# Patient Record
Sex: FEMALE | Race: WHITE | URBAN - METROPOLITAN AREA
[De-identification: names, ages, dates, MRNs, and addresses within clinical notes are randomized per-mention and may not be internally consistent; named-entity substitution may affect disease eponyms.]

---
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
## 2019-03-01 DIAGNOSIS — Z78.9 TRANSGENDER: ICD-10-CM

## 2019-03-01 NOTE — TELEPHONE ENCOUNTER
Note from 8/21/18 OV:  Follow up in 6 months.    [employer] message sent to patient asking him to schedule appointment.  Gwendolyn Lopes, RN    Requested Prescriptions   Pending Prescriptions Disp Refills     testosterone cypionate (DEPOTESTOSTERONE) 200 MG/ML injection [Pharmacy Med Name: TESTOST CYP 200MG/ML INJ]  19     Sig: INJECT 0.25 (ONE-FOURTH) ML (CC) INTRAMUSCULARLY ONCE A WEEK    Androgen Agents Failed - 3/1/2019 10:39 AM       Failed - Lipid panel on file in past 12 mos    Recent Labs   Lab Test 11/10/17  1619   CHOL 159   TRIG 109*   HDL 53   LDL 84   NHDL 106              Failed - ALT on file within past 12 mos    Recent Labs   Lab Test 11/10/17  1619   ALT 25            Failed - HCT less than 54% on file within past 12 mos    Recent Labs   Lab Test 11/10/17  1619   HCT 38.3            Failed - Serum Testosterone on file within past 12 mos    No lab results found.         Failed - Refills for this classification require provider review       Failed - Blood pressure under 140/90 in past 6 months    BP Readings from Last 3 Encounters:   08/21/18 117/77   11/10/17 100/60   07/03/12 105/52 (40 %/ 15 %)*     *BP percentiles are based on the August 2017 AAP Clinical Practice Guideline for girls                Failed - Recent (6 mo) or future (30 days) visit within the authorizing provider's specialty       Failed - AST on file within past 12 mos    Recent Labs   Lab Test 11/10/17  1619   AST 18            Passed - Patient is of age 12 and older       Passed - Medication is active on med list       Passed - Patient is not pregnant       Passed - No positive pregnancy test on file within past 12 mos

## 2019-03-06 NOTE — TELEPHONE ENCOUNTER
Patient has not read [employer] message  Left non detailed VM for pt asking that they callback and schedule f/u appt with PCP  Nanci HOBBS RN

## 2019-03-07 RX ORDER — TESTOSTERONE CYPIONATE 200 MG/ML
INJECTION, SOLUTION INTRAMUSCULAR
Qty: 10 ML | Refills: 0 | Status: SHIPPED | OUTPATIENT
Start: 2019-03-07 | End: 2019-04-29

## 2019-04-02 ENCOUNTER — TELEPHONE (OUTPATIENT)
Dept: FAMILY MEDICINE | Facility: CLINIC | Age: 19
End: 2019-04-02

## 2019-04-02 NOTE — TELEPHONE ENCOUNTER
Prior Authorization Retail Medication Request    Medication/Dose:   ICD code (if different than what is on RX):    Previously Tried and Failed:    Rationale:      Insurance Name:  [employer] [phone]  Insurance ID:  [id number]      Pharmacy Information (if different than what is on RX)  Name:  [facility] [address] [facility]  Phone:  [phone]

## 2019-04-08 NOTE — TELEPHONE ENCOUNTER
Central Prior Authorization Team   Phone: [phone]      PA Initiation    Medication: testosterone cypionate (DEPOTESTOSTERONE) 200 MG/ML injection  Insurance Company: [employer] - Phone [phone] Fax [phone]  Pharmacy Filling the Rx: [facility] [address] [place] [address]  Filling Pharmacy Phone: [phone]  Filling Pharmacy Fax:    Start Date: 4/8/2019

## 2019-04-08 NOTE — TELEPHONE ENCOUNTER
Prior Authorization Approval    Authorization Effective Date: 3/9/2019  Authorization Expiration Date: 4/7/2020  Medication: testosterone cypionate (DEPOTESTOSTERONE) 200 MG/ML injection-APPROVED  Approved Dose/Quantity:   Reference #:     Insurance Company: [employer] [phone] Fax [phone]  Expected CoPay:       CoPay Card Available:      Foundation Assistance Needed:    Which Pharmacy is filling the prescription (Not needed for infusion/clinic administered): [facility] [address] [place] [address]  Pharmacy Notified: Yes  Patient Notified: No    Pharmacy will notify patient when medication is ready.

## 2019-04-29 ENCOUNTER — OFFICE VISIT (OUTPATIENT)
Dept: FAMILY MEDICINE | Facility: CLINIC | Age: 19
End: 2019-04-29
Payer: COMMERCIAL

## 2019-04-29 VITALS
SYSTOLIC BLOOD PRESSURE: 127 MMHG | RESPIRATION RATE: 16 BRPM | OXYGEN SATURATION: 97 % | WEIGHT: 164 LBS | HEIGHT: 66 IN | DIASTOLIC BLOOD PRESSURE: 76 MMHG | BODY MASS INDEX: 26.36 KG/M2 | HEART RATE: 94 BPM | TEMPERATURE: 97.3 F

## 2019-04-29 DIAGNOSIS — Z72.89 DELIBERATE SELF-CUTTING: ICD-10-CM

## 2019-04-29 DIAGNOSIS — F32.3 MAJOR DEPRESSIVE DISORDER, SINGLE EPISODE, SEVERE WITH PSYCHOTIC FEATURES (H): ICD-10-CM

## 2019-04-29 DIAGNOSIS — Z78.9 TRANSGENDER: Primary | ICD-10-CM

## 2019-04-29 LAB
ALBUMIN SERPL-MCNC: 4.1 G/DL (ref 3.4–5)
ALP SERPL-CCNC: 80 U/L (ref 40–150)
ALT SERPL W P-5'-P-CCNC: 40 U/L (ref 0–50)
ANION GAP SERPL CALCULATED.3IONS-SCNC: 5 MMOL/L (ref 3–14)
AST SERPL W P-5'-P-CCNC: 21 U/L (ref 0–35)
BASOPHILS # BLD AUTO: 0 10E9/L (ref 0–0.2)
BASOPHILS NFR BLD AUTO: 0.2 %
BILIRUB SERPL-MCNC: 0.2 MG/DL (ref 0.2–1.3)
BUN SERPL-MCNC: 9 MG/DL (ref 7–30)
CALCIUM SERPL-MCNC: 8.9 MG/DL (ref 8.5–10.1)
CHLORIDE SERPL-SCNC: 108 MMOL/L (ref 96–110)
CO2 SERPL-SCNC: 27 MMOL/L (ref 20–32)
CREAT SERPL-MCNC: 0.74 MG/DL (ref 0.5–1)
DIFFERENTIAL METHOD BLD: NORMAL
EOSINOPHIL # BLD AUTO: 0.5 10E9/L (ref 0–0.7)
EOSINOPHIL NFR BLD AUTO: 6 %
ERYTHROCYTE [DISTWIDTH] IN BLOOD BY AUTOMATED COUNT: 14.1 % (ref 10–15)
GFR SERPL CREATININE-BSD FRML MDRD: >90 ML/MIN/{1.73_M2}
GLUCOSE SERPL-MCNC: 118 MG/DL (ref 70–99)
HCT VFR BLD AUTO: 42.9 % (ref 35–47)
HGB BLD-MCNC: 14.1 G/DL (ref 11.7–15.7)
LYMPHOCYTES # BLD AUTO: 2.6 10E9/L (ref 0.8–5.3)
LYMPHOCYTES NFR BLD AUTO: 31.3 %
MCH RBC QN AUTO: 28.3 PG (ref 26.5–33)
MCHC RBC AUTO-ENTMCNC: 32.9 G/DL (ref 31.5–36.5)
MCV RBC AUTO: 86 FL (ref 78–100)
MONOCYTES # BLD AUTO: 0.7 10E9/L (ref 0–1.3)
MONOCYTES NFR BLD AUTO: 8.5 %
NEUTROPHILS # BLD AUTO: 4.6 10E9/L (ref 1.6–8.3)
NEUTROPHILS NFR BLD AUTO: 54 %
PLATELET # BLD AUTO: 252 10E9/L (ref 150–450)
POTASSIUM SERPL-SCNC: 4.3 MMOL/L (ref 3.4–5.3)
PROT SERPL-MCNC: 7.5 G/DL (ref 6.8–8.8)
RBC # BLD AUTO: 4.98 10E12/L (ref 3.8–5.2)
SODIUM SERPL-SCNC: 140 MMOL/L (ref 133–144)
WBC # BLD AUTO: 8.4 10E9/L (ref 4–11)

## 2019-04-29 PROCEDURE — 99214 OFFICE O/P EST MOD 30 MIN: CPT | Performed by: FAMILY MEDICINE

## 2019-04-29 PROCEDURE — 36415 COLL VENOUS BLD VENIPUNCTURE: CPT | Performed by: FAMILY MEDICINE

## 2019-04-29 PROCEDURE — 85025 COMPLETE CBC W/AUTO DIFF WBC: CPT | Performed by: FAMILY MEDICINE

## 2019-04-29 PROCEDURE — 80053 COMPREHEN METABOLIC PANEL: CPT | Performed by: FAMILY MEDICINE

## 2019-04-29 RX ORDER — TESTOSTERONE CYPIONATE 200 MG/ML
75 INJECTION, SOLUTION INTRAMUSCULAR WEEKLY
Qty: 7 ML | Refills: 1 | Status: SHIPPED | OUTPATIENT
Start: 2019-04-29

## 2019-04-29 RX ORDER — QUETIAPINE FUMARATE 300 MG/1
TABLET, FILM COATED ORAL
COMMUNITY
Start: 2019-04-29

## 2019-04-29 ASSESSMENT — PATIENT HEALTH QUESTIONNAIRE - PHQ9: SUM OF ALL RESPONSES TO PHQ QUESTIONS 1-9: 9

## 2019-04-29 ASSESSMENT — MIFFLIN-ST. JEOR: SCORE: 1535.65

## 2019-04-29 NOTE — PROGRESS NOTES
SUBJECTIVE:   Shan Irwin is a 18 year old female who presents to clinic today for the following   health issues:      Medication Followup of testosterone     Taking Medication as prescribed: yes    Side Effects:  None    Medication Helping Symptoms:  yes       Last time was [facility] for mental health reasons  No suicide attempts  Since then is doing much better  Doesn't think starting Testosterone was a factor    [facility] Follow-up to PHQ 11/10/2017 8/23/2018 4/29/2019   PHQ-9 9. Suicide Ideation past 2 weeks Not at all More than half the days Several days   Thoughts of suicide or self harm in past 2 weeks - - No   PHQ-9 Safety concerns? - - No     Patient says that he has been overall stable with mood  Does still think about suicide sometimes but it is passive and does not have a plan    1-How do you identify? BOLD all that apply   Male   2-What age did you first feel your gender identity (internal sense of gender) did not match your physical body?      7th grade  3-Have you ever felt depressed or suicidal because your gender identity and body don't match?      YES    4-Have you legally changed your name? Yes   If yes: prior name for HETAL:   Gender marker on ID's?   Yes  5-Have you ever seen a health care provider about being transgender? YES therapy, but not specifically for gener issues  6-What hormones have you been on and for how long? (These can be treatments you were prescribed, that you got from a friend or bought without a prescription. Include any treatments you currently take.) NO  7-Ever had any problems with hormone treatment?  8-If not on hormones, would you like to be?   yes  What are your goals for hormone therapy ?  Facial Hair, deeper voice,   9-Have you had any gender affirmation surgery? No   10-Do you want to have surgery now or in future?  YES in the future  11-Are you out at work or at school or at home?      Everyone knows.  12-What are your other questions or concerns  "today?  Is there anything else we should know about you?  No      ----------  Shan is a 18year old individual that uses  pronouns He/Him/His/Himself that presents today for interest in masculinizing hormone therapy to better align their body with their gender identity.  Came to this clinic via referral from: self/friend (other patient)       Past Surgical History:   Procedure Laterality Date     NO HISTORY OF SURGERY         Patient Active Problem List   Diagnosis     No active problems     Anxiety     Deliberate self-cutting     Major depressive disorder, single episode, severe with psychotic features (H)     Difficulty with family     Transgender     Past Medical History:   Diagnosis Date     Patent ductus arteriosus     resolved at birth       Current Outpatient Medications   Medication Sig Dispense Refill     escitalopram (LEXAPRO) 20 MG tablet Take 1 tablet (20 mg) by mouth daily 30 tablet 1     lamoTRIgine (LAMICTAL) 100 MG tablet Take 1 tablet (100 mg) by mouth daily 60 tablet      needle, disp, 18G X 1\" MISC 18 G needle to Draw solution into syringe 20 each 3     Needle, Disp, 25G X 1\" MISC 1 each every 7 days (or similar needle for injecting IM )  Pharm/Patient choice of gauge and size 25 or 23G 20 each 3     QUEtiapine (SEROQUEL) 100 MG tablet Take 1 tablet (100 mg) by mouth At Bedtime 30 tablet 1     syringe, disposable, 1 ML MISC 1 Ml Syringe for IM injection.  Use 18G Needle to draw and 23 or 25 G to inject 20 each 3     testosterone cypionate (DEPOTESTOSTERONE) 200 MG/ML injection INJECT 0.25 (ONE-FOURTH) ML (CC) INTRAMUSCULARLY ONCE A WEEK 10 mL 0       History   Smoking Status     Never Smoker   Smokeless Tobacco     Never Used     Comment: no 2nd hand smoke in the house       Family History   Problem Relation Age of Onset     Diabetes Mother      Arthritis Mother      Obesity Mother      Hypertension Mother      Alcoholism Father      Arthritis Father      Depression Father      Heart Disease " Other         Family History of long QT syndrome     Diabetes Maternal Grandfather      Hypertension Maternal Grandfather      Hyperlipidemia Maternal Grandfather      Cancer - colorectal Paternal Grandfather      Heart Disease Paternal Grandfather         MI     Hypertension Maternal Grandmother      Arthritis Maternal Grandmother      Hyperlipidemia Maternal Grandmother        Allergies   Allergen Reactions     No Known Allergies        Mental Health Assessment:  psychiatric history with diagnoses of depression, anxiety, psychosis with current hallucinations, PTSD and has been followed by Dr Sanchez a psychiatrist in [facility], and takes Seroquel (1 1/2 years), Lamictal (4-5 months) and Lexapro (years) but has not had psychiatric hospitalization. Has been in psychotherapy with Linda of [facility] in [place] weekly until August when moved to [facility] with tristan, day treatment through [facility] about 2 years ago, group therapy. Sexual identification as a male since about 7th grade    Unable to get any significant records and unclear how stable things are           Review of Systems:   CONSTITUTIONAL: NEGATIVE for fever, chills, change in weight  PSYCHIATRIC: NEGATIVE for changes in mood or affect           Social History     Social History     Social History     Marital status: Single     Spouse name: N/A     Number of children: N/A     Years of education: N/A     Social History Main Topics     Smoking status: Never Smoker     Smokeless tobacco: Never Used      Comment: no 2nd hand smoke in the house     Alcohol use No     Drug use: No     Sexual activity: No     Other Topics Concern     None     Social History Narrative    10/3/2002    Parents now .  shared visits       Marital Status: Single      Sexual Health     Sexual concerns:  No   Pregnancy History:  No obstetric history on file.  Last Pap Smear :  No results found for: PAP  Abnormal Pap Hx:  None    Sexual health and  "relationships:  Current sexual partners: Not sexually active          Physical Exam:     Vitals:    04/29/19 0914   BP: 127/76   Pulse: 94   Resp: 16   Temp: 97.3  F (36.3  C)   TempSrc: Oral   SpO2: 97%   Weight: 74.4 kg (164 lb)   Height: 1.676 m (5' 6\")     BMI= Body mass index is 26.47 kg/m .   Appearance: male appearance and dress  GENERAL:: healthy, alert and no distress  SKIN: Scars on arms from cutting  NEURO: Normal strength and tone, sensory exam grossly normal, mentation intact and speech normal, reflexes symmetric  Psych: Alert and oriented times 3; coherent speech, normal rate and volume, able to articulate logical thoughts, able to abstract reason, no tangential thoughts, no hallucinations or delusions.     Assessment and Plan   Shan was seen today  Diagnoses and all orders for this visit:  1. Transgender    Good progress on body changes  Has been slow as far as development of body hair and such  We will increase the testosterone      2. Major depressive disorder, single episode, severe with psychotic features (H)  Mental health not stable,    Getting some outside mental health  Recently had increased his Seroquel      Today s visit included assessment of interventions to alleviate symptoms related to gender dysphoria or gender nonconformity, including     psychological support    medical treatment (hormones or blockers)    options for social support or changes in gender expression.       (This assessment is based on the 2011 published Standards of Care for the Health of Transsexual, Transgender, and Gender-Nonconforming People, Version 7, by the [employer] of Transgender Health. WPATH SOC Guidelines)    Follow up:  Follow up in 6 months.    Lukasz Melendez MD                "

## 2019-04-29 NOTE — PATIENT INSTRUCTIONS
Oscar Herman, LGSW, MPA  Pronouns: he/him/his  Transgender  Care Coordinator   [facility] [place]  [address] [address]  [email or web address]  [email or web address]  Appointments: [phone] Office: [phone] Fax: [phone]

## 2019-11-04 ENCOUNTER — HEALTH MAINTENANCE LETTER (OUTPATIENT)
Age: 19
End: 2019-11-04

## 2020-11-16 ENCOUNTER — HEALTH MAINTENANCE LETTER (OUTPATIENT)
Age: 20
End: 2020-11-16

## 2021-01-15 ENCOUNTER — HEALTH MAINTENANCE LETTER (OUTPATIENT)
Age: 21
End: 2021-01-15

## 2021-09-18 ENCOUNTER — HEALTH MAINTENANCE LETTER (OUTPATIENT)
Age: 21
End: 2021-09-18

## 2022-01-08 ENCOUNTER — HEALTH MAINTENANCE LETTER (OUTPATIENT)
Age: 22
End: 2022-01-08

## 2022-11-20 ENCOUNTER — HEALTH MAINTENANCE LETTER (OUTPATIENT)
Age: 22
End: 2022-11-20

## 2023-04-15 ENCOUNTER — HEALTH MAINTENANCE LETTER (OUTPATIENT)
Age: 23
End: 2023-04-15